# Patient Record
(demographics unavailable — no encounter records)

---

## 2025-07-21 NOTE — REASON FOR VISIT
[Symptom and Test Evaluation] : symptom and test evaluation [Hyperlipidemia] : hyperlipidemia [FreeTextEntry3] : Dr. Perez

## 2025-07-21 NOTE — HISTORY OF PRESENT ILLNESS
[FreeTextEntry1] : 40F w/ PMH of HLD. anxiety. asthma, FH of brain aneurysm and CAD, GERD presents to establish care with cardiology.  Over the last 4 years she has had 2 emergency room visits for chest tightness without ensuing ischemic workup.  Her most recent event was last week.  She felt a popping sensation in her ear and then did not feel right for the rest of the day.  She then had 40 minutes of central chest pains and decided go to the ER.  Her EKG was abnormal but unchanged from her prior in 2021 with nonspecific ST-T wave changes.  Her troponins were negative and she was sent home.

## 2025-07-21 NOTE — DISCUSSION/SUMMARY
[Patient] : the patient [With Me] : with me [FreeTextEntry3] : Posttesting [FreeTextEntry1] : 40F with past medical history as above presenting for cardiology evaluation of chest pains.  Given her family history, hyperlipidemia and abnormal EKG, she should have an ischemic evaluation.  Her ST segments will be uninterpretable due to baseline abnormalities.  1.  Chest pains-atypical, obtain TTE and CCTA. 2.  Family history of brain aneurysm-with recent symptoms of ear popping, plan on brain MRI. 3.  Hyperlipidemia-continue atorvastatin 10 mg p.o. nightly.  RTC posttesting.

## 2025-07-21 NOTE — CARDIOLOGY SUMMARY
[de-identified] : 7/16/2025: Normal sinus rhythm, nonspecific ST-T wave changes inferior and lateral.